# Patient Record
(demographics unavailable — no encounter records)

---

## 2024-10-23 NOTE — ASSESSMENT
[FreeTextEntry1] : Patient's in the last visit has had a marked weight loss alternating diarrhea and constipation with no clear etiology.  She appears depressed.  Presently there are no active cardiac issues in October 2024 except that her blood pressure is somewhat elevated but heart rate is well-controlled in A-fib ventricular rate controlled.  1.  Status post COVID-19 and long COVID 2.  Episodes of fatigue fever in June 2023 DAVID no evidence of endocarditis 3.  Atrial fibrillation ventricular rate controlled 4.  Essential hypertension mostly systolic hypertension 5.  Status post mitral valve repair 6.  Weight loss abdominal complaints etiology unclear It is certainly possible that a good deal of her weight loss is related to depression.  There are no active cardiac issues

## 2024-10-23 NOTE — DISCUSSION/SUMMARY
[FreeTextEntry1] :  There were no active cardiac issues at the present time.  Her blood pressure needs little bit better control but she is intolerant to most medications The etiology of her weight loss still remains unclear I suggested a repeat echocardiogram to reevaluate LV RV function and the mitral valve repair.  I am not suspicious that there is any acute cardiac issue [EKG obtained to assist in diagnosis and management of assessed problem(s)] : EKG obtained to assist in diagnosis and management of assessed problem(s)

## 2024-10-23 NOTE — PHYSICAL EXAM
[Well Developed] : well developed [Well Nourished] : well nourished [No Carotid Bruit] : no carotid bruit [Normal S1, S2] : normal S1, S2 [Clear Lung Fields] : clear lung fields [Soft] : abdomen soft [Non Tender] : non-tender [No Edema] : no edema [de-identified] : Anxious and appears slightly depressed patient is thin with some temporal wasting [de-identified] : Pink conjunctiva [de-identified] : S1-S2 irregularly irregular heart rate well-controlled [de-identified] : Thin

## 2024-10-23 NOTE — REASON FOR VISIT
[FreeTextEntry1] : Missy Gulshan 81 years old here for follow-up of her cardiac status.  I have not seen her since November 2023

## 2024-11-13 NOTE — ASSESSMENT
[FreeTextEntry1] : Patient's in the last visit has had a marked weight loss alternating diarrhea and constipation with no clear etiology.  She appears depressed.  Presently there are no active cardiac issues in October 2024 except that her blood pressure is somewhat elevated but heart rate is well-controlled in A-fib ventricular rate controlled.  The patient has normal LV function on echocardiogram November 13, 2024 with mild mitral regurgitation and perhaps some degree of aortic insufficiency but normal LV function full report to follow I believe most of her present issues are related to her underlying anxiety for which she is seeing a psychiatrist.  She does have a history of hyponatremia and most of the antipsychotics antidepressants to exacerbate this issue  1.  Status post COVID-19 and long COVID 2.  Episodes of fatigue fever in June 2023 DAVID no evidence of endocarditis 3.  Atrial fibrillation ventricular rate controlled 4.  Essential hypertension mostly systolic hypertension-better controlled today 5.  Status post mitral valve repair 6.  Weight loss abdominal complaints etiology unclear 7.  Swallowing difficulty etiology unclear cleared by ENT It is certainly possible that a good deal of her weight loss is related to depression.  There are no active cardiac issues

## 2024-11-13 NOTE — DISCUSSION/SUMMARY
[FreeTextEntry1] : The patient continue on her present regimen of medication.  I reassured her that her cardiac status was stable.  Hopefully with manipulation of her antidepressant medication she will tolerate them and improve her overall status. [EKG obtained to assist in diagnosis and management of assessed problem(s)] : EKG obtained to assist in diagnosis and management of assessed problem(s)

## 2024-11-13 NOTE — PHYSICAL EXAM
[Well Developed] : well developed [Well Nourished] : well nourished [No Carotid Bruit] : no carotid bruit [Normal S1, S2] : normal S1, S2 [Clear Lung Fields] : clear lung fields [Soft] : abdomen soft [Non Tender] : non-tender [No Edema] : no edema [Normal Gait] : normal gait [de-identified] : Anxious and appears slightly depressed patient is thin with some temporal wasting [de-identified] : Pink conjunctiva [de-identified] : S1-S2 irregularly irregular heart rate well-controlled 1/6 murmur at the apex at times I felt I heard a minimal diastolic blow [de-identified] : Thin

## 2024-11-13 NOTE — REASON FOR VISIT
[FreeTextEntry1] : Missy Gaffney returns for follow-up of her cardiac status.  I recently saw her and she was scheduled today for a 2D echo which I reviewed she was seen November 13, 2024

## 2024-11-13 NOTE — HISTORY OF PRESENT ILLNESS
[FreeTextEntry1] : Missy is well-known to me.  She has chronic atrial fibrillation on anticoagulation, hypertension, anxiety and is status post mitral valve repair many years ago.    The patient recently has had a viral type syndrome with cough and sputum production was started on antibiotics.  She has been complaining of palpitations and not feeling well.  I spoke to her internist Dr. Mendoza and set up this telemedicine visit   In November 2023 the patient was feeling well but still complaining of palpitations.  She is eating and drinking better.  She has no fever but does have chills which is a chronic issue with her.  She has some intermittent shortness of breath.  She unfortunately takes her pulse too often but has been complaining of palpitations.  During the night particularly her pulse can go up to 120 bpm  Since I last saw the patient she apparently is feeling weak and tired had a viral syndrome and apparently had a low sodium.  Visit May 2, 2023: Since the episode of hyponatremia and the viral syndrome she has been feeling herself with some nonspecific shortness of breath anxiety and extreme tiredness and fatigue.  She even saw her nephrologist Dr. Givens who told her to restrict her fluid intake  Recent blood tests from Dr. Mendoza cholesterol 187 HDL 67 triglycerides 90  BUN 19 creatinine 1.64 sodium 139 potassium 4.2 chloride 101 CO2 32 normal liver function  Visit November 1, 2023: The patient developed COVID-19 sometime in May, took Paxlovid and subsequently continued to feel ill and felt that she had long COVID.  She had extreme fatigue some fevers weight loss.  There was concern for endocarditis.  She had a transesophageal echocardiogram at [facility] which did not reveal any evidence of endocarditis normal left ventricular function with a well-functioning mitral repair  The patient has been feeling better with better energy.  She presently is feeling better but not perfect.  She remains on stable medication EKG November 1, 2023 a flutter ventricular rate controlled otherwise within normal limits  Visit October 23, 2024 I last saw the patient almost 1 year ago.  Since that time she is lost a considerable amount of weight having vague abdominal complaints and has been seen by GI.  She admits to depression but she cannot tolerate antidepressants because of hyponatremia which has been an intermittent issue.  She denies chest pain chest pressure or palpitations.  She does take a pulse frequently and it is well-controlled ventricular rate to the atrial fibrillation.  Her medications are stable  The etiology of her weight loss and abdominal discomfort remains unclear.  She is trying to put on weight.  Apparently her blood tests were relatively unremarkable  EKG October 23, 2024 atrial fibrillation ventricular rate controlled otherwise within normal limits  Patient's weight has gone from 133 pounds to 110 pounds  Visit November 13, 2024 2D echo was performed today preliminarily reveals normal LV function mild mitral regurgitation and perhaps some degree of aortic insufficiency though clinically she does not have an aortic insufficiency murmur She was placed on a new antidepressant and noted that her heart rate was slower and was concerned that perhaps it was related to this.  She still having problems swallowing and still losing weight.  She saw ENT who did not find an ENT etiology for her swallowing issue  She denies chest pain chest pressure shortness of breath Medication clued alprazolam atenolol buspirone Eliquis 5 mg twice a day levothyroxine 25 mcg losartan 100 mg Protonix and vitamin D

## 2025-01-24 NOTE — REASON FOR VISIT
[FreeTextEntry1] : Missy Rameshconchaar 82 years old here for follow-up of her cardiac status.  She called that her heart rate has been slow at heart rates of 45-50 and she felt weak which prompted the present urgent visit.  She was seen on January 24, 2025

## 2025-01-24 NOTE — ASSESSMENT
[FreeTextEntry1] : Patient's in the last visit has had a marked weight loss alternating diarrhea and constipation with no clear etiology.  She appears depressed.  Presently there are no active cardiac issues in October 2024 except that her blood pressure is somewhat elevated but heart rate is well-controlled in A-fib ventricular rate controlled.  The patient has normal LV function on echocardiogram November 13, 2024 with mild mitral regurgitation and perhaps some degree of aortic insufficiency but normal LV function full report to follow I believe most of her present issues are related to her underlying anxiety for which she is seeing a psychiatrist.  She does have a history of hyponatremia and most of the antipsychotics antidepressants to exacerbate this issue In January 2025 she complained of feeling weak and a slow pulse and apparently is in normal sinus rhythm on January 24, 2025.  She has significant depression and symptoms of peripheral Her blood pressure is well-controlled her lungs are clear and there are no active cardiac issues.  1.  Status post COVID-19 and long COVID 2.  Episodes of fatigue fever in June 2023 DAVID no evidence of endocarditis 3.  Atrial fibrillation ventricular rate controlled  And presently she is -in normal sinus rhythm with a sinus bradycardiaToday on January 25, 2025 she is in normal sinus rhythm with a sinus bradycardia 4.  Essential hypertension mostly systolic hypertension-better controlled today 5.  Status post mitral valve repair 6.  Weight loss abdominal complaints etiology unclear 7.  Swallowing difficulty etiology unclear cleared by ENT

## 2025-01-24 NOTE — PHYSICAL EXAM
[Well Developed] : well developed [Well Nourished] : well nourished [No Carotid Bruit] : no carotid bruit [Normal S1, S2] : normal S1, S2 [Clear Lung Fields] : clear lung fields [Soft] : abdomen soft [Non Tender] : non-tender [Normal Gait] : normal gait [No Edema] : no edema [de-identified] : Anxious and appears slightly depressed patient is thin with some temporal wasting [de-identified] : Pink conjunctiva [de-identified] : S1-S2 irregularly irregular heart rate well-controlled 1/6 murmur at the apex at times I felt I heard a minimal diastolic blow [de-identified] : Thin

## 2025-01-24 NOTE — DISCUSSION/SUMMARY
[EKG obtained to assist in diagnosis and management of assessed problem(s)] : EKG obtained to assist in diagnosis and management of assessed problem(s) [FreeTextEntry1] : I told her to decrease her atenolol and take just atenolol 25 once a day I reassured her that her cardiac status was stable and that a good deal of his symptoms are probably related to depression and the peripheral neuropathy which is being further evaluated She will take her pulse less frequently but if she reverts back to atrial fibrillation with a increased ventricular response we will increase the atenolol as needed  Routine follow-up again in 3 months

## 2025-01-24 NOTE — HISTORY OF PRESENT ILLNESS
[FreeTextEntry1] : Missy is well-known to me.  She has chronic atrial fibrillation on anticoagulation, hypertension, anxiety and is status post mitral valve repair many years ago.    The patient recently has had a viral type syndrome with cough and sputum production was started on antibiotics.  She has been complaining of palpitations and not feeling well.  I spoke to her internist Dr. Mendoza and set up this telemedicine visit   In November 2023 the patient was feeling well but still complaining of palpitations.  She is eating and drinking better.  She has no fever but does have chills which is a chronic issue with her.  She has some intermittent shortness of breath.  She unfortunately takes her pulse too often but has been complaining of palpitations.  During the night particularly her pulse can go up to 120 bpm  Since I last saw the patient she apparently is feeling weak and tired had a viral syndrome and apparently had a low sodium.  Visit May 2, 2023: Since the episode of hyponatremia and the viral syndrome she has been feeling herself with some nonspecific shortness of breath anxiety and extreme tiredness and fatigue.  She even saw her nephrologist Dr. Givens who told her to restrict her fluid intake  Recent blood tests from Dr. Mendoza cholesterol 187 HDL 67 triglycerides 90  BUN 19 creatinine 1.64 sodium 139 potassium 4.2 chloride 101 CO2 32 normal liver function  Visit November 1, 2023: The patient developed COVID-19 sometime in May, took Paxlovid and subsequently continued to feel ill and felt that she had long COVID.  She had extreme fatigue some fevers weight loss.  There was concern for endocarditis.  She had a transesophageal echocardiogram at [facility] which did not reveal any evidence of endocarditis normal left ventricular function with a well-functioning mitral repair  The patient has been feeling better with better energy.  She presently is feeling better but not perfect.  She remains on stable medication EKG November 1, 2023 a flutter ventricular rate controlled otherwise within normal limits  Visit October 23, 2024 I last saw the patient almost 1 year ago.  Since that time she is lost a considerable amount of weight having vague abdominal complaints and has been seen by GI.  She admits to depression but she cannot tolerate antidepressants because of hyponatremia which has been an intermittent issue.  She denies chest pain chest pressure or palpitations.  She does take a pulse frequently and it is well-controlled ventricular rate to the atrial fibrillation.  Her medications are stable  The etiology of her weight loss and abdominal discomfort remains unclear.  She is trying to put on weight.  Apparently her blood tests were relatively unremarkable  EKG October 23, 2024 atrial fibrillation ventricular rate controlled otherwise within normal limits  Patient's weight has gone from 133 pounds to 110 pounds  Visit November 13, 2024 2D echo was performed today preliminarily reveals normal LV function mild mitral regurgitation and perhaps some degree of aortic insufficiency though clinically she does not have an aortic insufficiency murmur She was placed on a new antidepressant and noted that her heart rate was slower and was concerned that perhaps it was related to this.  She still having problems swallowing and still losing weight.  She saw ENT who did not find an ENT etiology for her swallowing issue  She denies chest pain chest pressure shortness of breath Medication clued alprazolam atenolol buspirone Eliquis 5 mg twice a day levothyroxine 25 mcg losartan 100 mg Protonix and vitamin D  Visit January 24, 2025 Patient had an echo on last visit which revealed normal LV function mild mitral regurgitation status post mitral valve repair and moderate aortic regurgitation with normal size LV with normal LV function The patient called yesterday that her heart rate has been slow at 45-50 and she is feeling weak.  Actually her major complaint is just not feeling well and numbness in her legs difficult to stand for long periods of time under evaluation for peripheral neuropathy by neurology.  She does have a history of depression but cannot take a good number of the antidepressants because of hyponatremia  EKG January 24, 2025 sinus bradycardia within normal limits.  This is the first time that she has had an EKG with sinus rhythm which is probably the reason that she is presently bradycardia..  She has been in chronic atrial fibrillation for many years.  As her pulse was slow we held the beta-blocker which she used to take almost 75 mg a day spaced over the day

## 2025-07-24 NOTE — HISTORY OF PRESENT ILLNESS
[FreeTextEntry1] : Missy Gaffney is a 83 yo F with PMH of depression w/ psychotic features, atrial fibrillation on Eliquis, pulmonary HTN, HTN, HLD, hypothyroid, cavernoma presented to ED from [facility] with syncope, hypotension, and dizziness, admitted for syncope workup. EKG revealed atrial tachycardia with slow VR. EP was consulted for EKG abnormalities and recommended starting metoprolol ER 25 mg once daily. TTE from May 2025 revealed normal LVEF, moderate AR/TR/MR and severe pulmonary hypertension. Repeat TTE during this admission showed decreased pulmonary artery systolic pressure, consistent with normal pulmonary artery pressures. During her hospitalization, patient also had fluctuating cognition and workup for reversible causes of delirium was done. TSH and B12 were within normal limits. Patient had one episode of agitation overnight, and IM Zyprexa was given. Otherwise, patient has not required any PRN medications for agitation.